# Patient Record
Sex: FEMALE | Race: WHITE | HISPANIC OR LATINO
[De-identification: names, ages, dates, MRNs, and addresses within clinical notes are randomized per-mention and may not be internally consistent; named-entity substitution may affect disease eponyms.]

---
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
## 2023-06-13 NOTE — H&P PST ADULT - ANTERIOR CERVICAL R
Anesthesia Post-op Note    Patient: Deloris Simental  Procedure(s) Performed: COLONOSCOPY  Anesthesia type: MAC    Vitals Value Taken Time   Temp 36.8 06/13/23 0902   Pulse 63 06/13/23 0902   Resp 21 06/13/23 0902   SpO2 97 06/13/23 0902   /53 06/13/23 0902         Patient Location: Phase II  Post-op Vital Signs:stable  Level of Consciousness: awake, sedated and participates in exam  Respiratory Status: spontaneous ventilation, unassisted and room air  Cardiovascular stable  Hydration: euvolemic  Pain Management: adequately controlled  Handoff: Handoff to receiving clinician was performed and questions were answered  Vomiting: none  Nausea: None  Airway Patency:patent  Post-op Assessment: awake, alert, appropriately conversant, or baseline, no complications, patient tolerated procedure well, no evidence of recall, dentition within defined limits and moving all extremities      There were no known notable events for this encounter.  
normal

## 2023-07-11 ENCOUNTER — RESULT CHARGE (OUTPATIENT)
Age: 18
End: 2023-07-11

## 2023-07-12 DIAGNOSIS — Q20.8 OTHER CONGENITAL MALFORMATIONS OF CARDIAC CHAMBERS AND CONNECTIONS: ICD-10-CM

## 2023-07-14 ENCOUNTER — APPOINTMENT (OUTPATIENT)
Dept: PEDIATRIC CARDIOLOGY | Facility: CLINIC | Age: 18
End: 2023-07-14
Payer: MEDICAID

## 2023-07-14 VITALS
DIASTOLIC BLOOD PRESSURE: 81 MMHG | BODY MASS INDEX: 31.49 KG/M2 | HEIGHT: 62.6 IN | HEART RATE: 81 BPM | WEIGHT: 175.49 LBS | SYSTOLIC BLOOD PRESSURE: 120 MMHG | OXYGEN SATURATION: 92 %

## 2023-07-14 PROCEDURE — 93303 ECHO TRANSTHORACIC: CPT

## 2023-07-14 PROCEDURE — 99214 OFFICE O/P EST MOD 30 MIN: CPT | Mod: 25

## 2023-07-14 PROCEDURE — 93320 DOPPLER ECHO COMPLETE: CPT

## 2023-07-14 PROCEDURE — 93000 ELECTROCARDIOGRAM COMPLETE: CPT

## 2023-07-14 PROCEDURE — 93325 DOPPLER ECHO COLOR FLOW MAPG: CPT

## 2023-07-14 RX ORDER — AMOXICILLIN 500 MG/1
500 CAPSULE ORAL
Qty: 4 | Refills: 4 | Status: ACTIVE | COMMUNITY
Start: 2023-07-14 | End: 1900-01-01

## 2023-07-14 NOTE — PHYSICAL EXAM
[General Appearance - Alert] : alert [Facies] : the head and face were normal in appearance [Sclera] : the sclera were normal [Auscultation Breath Sounds / Voice Sounds] : breath sounds clear to auscultation bilaterally [No Cough] : no cough [Sternotomy] : sternotomy [Well-Healed] : well-healed [Apical Impulse] : quiet precordium with normal apical impulse [Heart Rate And Rhythm] : normal heart rate and rhythm [Arterial Pulses] : normal upper and lower extremity pulses with no pulse delay [Edema] : no edema [Normal S1] : normal  [S2 Single] : was single [LUSB] : LUSB [Abdomen Soft] : soft [Nail Clubbing] : no clubbing  or cyanosis of the fingers [Demonstrated Behavior - Infant Nonreactive To Parents] : interactive [No Murmur] : no murmurs  [Palpable___cm below the RCM] : palpable [unfilled] cm below the right costal margin [Cervical Lymph Nodes Enlarged Anterior] : The anterior cervical nodes were normal [Cervical Lymph Nodes Enlarged Posterior] : The posterior cervical nodes were normal

## 2023-07-20 NOTE — HISTORY OF PRESENT ILLNESS
[FreeTextEntry1] : We had the pleasure of evaluating Sarahy Farley today in the Adult Congenital Heart Program at [facility]. 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 HEMODYNAMIC CATH: 2003 (PRE – [facility])\par 2. 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 Calixto\par 3. HEMODYNAMIC CATH: 12/3/2007 (PRE-FONTAN)\par 4. OR: 1/18/2008 – Non Fenestrated Extracardiac completion Fontan with 16 mm stretch Goretex tube\par 5. INTERVENTIONAL CATH: 7/29/2021 (right and left heart), coiling of tortuous veno-venous collateral from left innominate vein to the left atrium\par CO: 4.96L/min, Fontan pressure 10mmHg, right and left capillary wedge 5mmHg, LVEDp 5mmHg\par \par Sarahy is attending college and studying photography. She also works part time.  She is in need of dental care, not having been to a dentist in years as far as she can remember. \par \par At her last visit she had reported bloody stools and was having a colonoscopy. According to her, colonoscopy was negative with the exception of hemorrhoids that were the cause of the blood in her stools. \par \par In terms of her cardiovascular health, she has no limitations and goes to the gym regularly where she can perform 1 hour of aerobic activity.  She has no palpitations, chest discomfort or cough. There is no report of ascites, lower extremity edema. There is no bleeding or bruising  and she remains on her Aspirin.\par \par She is accompanied today by her partner.

## 2023-07-20 NOTE — REVIEW OF SYSTEMS
[Feeling Poorly] : not feeling poorly (malaise) [Fever] : no fever [Eye Discharge] : no eye discharge [Change in Vision] : no change in vision [Nasal Stuffiness] : no nasal congestion [Cyanosis] : no cyanosis [Chest Pain] : no chest pain or discomfort [Palpitations] : no palpitations [Orthopnea] : no orthopnea [Fast HR] : no tachycardia [Cough] : no cough [Vomiting] : no vomiting [Decrease In Appetite] : appetite not decreased [Fainting (Syncope)] : no fainting [Seizure] : no seizures [Headache] : no headache [Rash] : no rash [Easy Bruising] : no tendency for easy bruising [Easy Bleeding] : no ~M tendency for easy bleeding [Nosebleeds] : no epistaxis [Hyperactive] : no hyperactive behavior [Failure To Thrive] : no failure to thrive [Dec Urine Output] : no oliguria

## 2023-07-20 NOTE — CARDIOLOGY SUMMARY
[Today's Date] : [unfilled] [FreeTextEntry1] : NSR,ventricular rate 58 bpm,  left anterior fascicular block [FreeTextEntry2] : Summary:\par 1. Technically difficult study due to limited echo windows.\par 2. Double outlet right ventricle.\par 3. Status post placement of right bidirectional Monty shunt.\par 4. Status post extracardiac non-fenestrated Fontan conduit.\par 5. The right bidirectional Monty anastomosis is patent with low velocity laminar flow. The inferior limb of the Fontan completion is not seen to be able to comment on patency or thrombi. Consider alternative imaging.\par 6. Laminar flow is noted in the right pulmonary artery to the left of the Monty anastomosis and the\par immediate proximal portion of the left pulmonary artery. No obvious thrombi are seen in the visualized\par portions of the pulmonary arteries. The right pulmonary artery to the right of the Monty and majority of the left pulmonary artery are not seen.\par 7. Trivial aortic valve regurgitation.\par 8. Normal left ventricular systolic function.\par 9. Moderately hypoplastic right ventricle.\par 10. Poor and inadequate imaging of the right ventricular free wall to assess systolic function accurately.\par 11. Right aortic arch established on previous study.\par 12. The blind pulmonary artery stump is not seen.\par 13. No pericardial effusion.

## 2023-07-20 NOTE — DISCUSSION/SUMMARY
[Needs SBE Prophylaxis] : [unfilled]  needs bacterial endocarditis prophylaxis. SBE prophylaxis is indicated for dental and invasive ENT procedures. (Circulation. 2007; 116: 4804-3552) [FreeTextEntry1] : In summary, Sarahy is a 18 year old female s/p extracardiac Fontan for complex DORV anatomy. Her saturations are slightly lower today at 92% where they were 96% at her last visit so we will keep a close eye on this.\par \par We have previously discussed the increased incidence of cirrhosis and Fontan associated liver disease.  We would like her to see our Hepatologist who specializes in patients with Fontan physiology and undergo screening and ongoing surveillance with this specialized team.  She has no evidence of protein losing enteropathy at her visit today. \par \par We again reviewed the importance of meticulous dental hygiene and the need for regular dental cleanings. Sarahy will need lifetime SBE prophylaxis. She did not have the name of a local pharmacy but will get that information to us so that we may put an order in for antibiotics prior to any dental work.\par \par Lastly, she has no record of any advanced imaging in the form of a cardiac MRI so we have requested that she have an MRI so we may better image her Monty and Fontan conduits, her PA anatomy, coronaries and for any evidence of veno-venous collaterals.\par \par She has been educated to notify us for any swelling in her abdomen or legs, change in her stools or change in exercise capacity.\par \par We will see her back in eight months but remain available to see her sooner if clinically indicated.

## 2023-07-20 NOTE — CONSULT LETTER
[Today's Date] : [unfilled] [Name] : Name: [unfilled] [] : : ~~ [Today's Date:] : [unfilled] [Dear  ___:] : Dear Dr. [unfilled]: [Consult] : I had the pleasure of evaluating your patient, [unfilled]. My full evaluation follows. [Sincerely,] : Sincerely, [Consult - Multiple Provider] : Thank you very much for allowing us to participate in the care of this patient. If you have any questions, please do not hesitate to contact us. [FreeTextEntry4] : Quinton Law MD [FreeTextEntry5] : [address] [place] [address]\par  [de-identified] : Lea Hagen, MSN, CPNP-AC, PC\par Pediatric Cardiology, Adult Congenital Cardiology\par [facility]\par [facility] Pantoja [facility]\par \par Jarad Dominguez MD, TIFFANIE, MBI\par Medical Director, Adult Congenital Heart Program\par Professor of Pediatrics\par [employer] [facility] [employer] at [facility]\par

## 2023-10-02 ENCOUNTER — APPOINTMENT (OUTPATIENT)
Dept: HEPATOLOGY | Facility: CLINIC | Age: 18
End: 2023-10-02

## 2024-08-05 ENCOUNTER — APPOINTMENT (OUTPATIENT)
Dept: CARDIOLOGY | Facility: CLINIC | Age: 19
End: 2024-08-05

## 2024-08-05 ENCOUNTER — NON-APPOINTMENT (OUTPATIENT)
Age: 19
End: 2024-08-05

## 2024-08-05 PROCEDURE — 93000 ELECTROCARDIOGRAM COMPLETE: CPT

## 2024-08-05 PROCEDURE — 99204 OFFICE O/P NEW MOD 45 MIN: CPT | Mod: 25

## 2024-08-05 NOTE — REVIEW OF SYSTEMS
Return in about 2 weeks (around 9/25/2017), or if symptoms worsen or fail to improve. Dr LORETO Littlejohn or Dr JEANINE Aguiar    Please follow up for left knee symptoms that persist or worsen as discussed and call 911 or go to ER for any severe symptoms.    Continue current meds per Epic as advised.    Please see After Visit Summary for other details of the remaining discussion.    Use knee pads to reduce knee irritation.    Avoid needle aspiration of left knee bursal cyst.    Use yogurt or probiotics in diet three times a day for two weeks to prevent diarrhea or yeast infection from antibiotc use.    Tramadol use can cause confusion. Initiate medication at home to determine if this side effect occurs.  What is bursitis?  A bursa is a fluid-filled sac that helps cushion the muscles, tendons, and bones around a joint. When a bursa becomes inflamed, it’s called bursitis. Common symptoms of bursitis include pain, tenderness, and swelling that limits movement of the joint.  What causes bursitis?  Bursitis is most often caused by overuse of a joint. The repeated movements irritate the bursa and may cause it to swell. When that happens, other surrounding tissues may become inflamed or have less space to move. Bursitis is most common in large joints such as the knee, shoulder, and hip.       Nonsurgical treatment involves both rest and exercise.    How is bursitis treated?  To help reduce pain and swelling, your healthcare provider may recommend one or more of the following:   · Rest gives the bursa time to heal. This means limiting activities that put stress on the joint.  · Anti-inflammatory medications help reduce painful swelling. In some cases, this can include injections of cortisone or other steroid medicines into the bursa.  · Splints and support bandages improve your comfort and allow the bursa to heal.  · Physical therapy may be used to increase flexibility and strengthen muscles that support the  joint.  · Aspiration removes extra fluid from the bursa using a needle. This can help your healthcare provider find out what is causing your bursitis. For example, it might be an infection or overuse.   · Surgery can be used to remove an inflamed or infected bursa. This is rarely needed.  © 8594-9214 [employer]. [address] [place] [address], [place] [address]. All rights reserved. This information is not intended as a substitute for professional medical care. Always follow your healthcare professional's instructions.         [Negative] : Heme/Lymph

## 2024-08-09 ENCOUNTER — APPOINTMENT (OUTPATIENT)
Dept: CV DIAGNOSITCS | Facility: HOSPITAL | Age: 19
End: 2024-08-09

## 2024-08-09 ENCOUNTER — RESULT REVIEW (OUTPATIENT)
Age: 19
End: 2024-08-09

## 2024-08-09 ENCOUNTER — OUTPATIENT (OUTPATIENT)
Dept: OUTPATIENT SERVICES | Facility: HOSPITAL | Age: 19
LOS: 1 days | End: 2024-08-09
Payer: MEDICAID

## 2024-08-09 ENCOUNTER — TRANSCRIPTION ENCOUNTER (OUTPATIENT)
Age: 19
End: 2024-08-09

## 2024-08-09 DIAGNOSIS — Z98.890 OTHER SPECIFIED POSTPROCEDURAL STATES: Chronic | ICD-10-CM

## 2024-08-09 DIAGNOSIS — R00.2 PALPITATIONS: ICD-10-CM

## 2024-08-09 PROCEDURE — 93306 TTE W/DOPPLER COMPLETE: CPT | Mod: 26

## 2024-08-13 NOTE — ADDENDUM
[FreeTextEntry1] :  I have spoken with and examined the patient. I agree with the history, exam and assessment and plan.

## 2024-08-13 NOTE — DISCUSSION/SUMMARY
[EKG obtained to assist in diagnosis and management of assessed problem(s)] : EKG obtained to assist in diagnosis and management of assessed problem(s) [FreeTextEntry1] : In summary, Ms. Sarahy Farley is a y/o 18 y/o F seen today to [facility] care Cardiology program with complex congenital heart disease. She has been followed over the years at  the Adult Congenital Heart Program at [facility] and was referred for Adult Congenital Heart Care. 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 -She has undergone the following surgical interventions: notes from congenital heart program:  1. HEMODYNAMIC CATH: 2003 (PRE - SHAHRAM)  2. OR: 2004 - Bi-directional Shahram anastamosis and creation of pulmonary atresia, [facility], Dr. Calixto  3. HEMODYNAMIC CATH: 12/3/2007 (PRE-FONTAN)  4. OR: 2008 - Non Fenestrated Extracardiac completion Fontan with 16 mm stretch Goretex tube  5. INTERVENTIONAL CATH: 2021 (right and left heart), coiling of tortuous veno-venous collateral from left innominate vein to the left atrium  (CO: 4.96L/min, Fontan pressure 10mmHg, right and left capillary wedge 5mmHg, LVEDp 5mmHg) -Remainder delilah history remarkable for : Left ovarian cyst ( dysmenorrhea),  FH: unremarkable  Activity: walks, but no conditioning activities  Diet: admits to dietary indiscretion with high CHO and High Fat intake, attempting to increase fruits and vegetable.  Hydrates fairly well about 1 liter daily Meds: ASA 81 mg daily NKA  Diagnostics: TTE 2023 ( congenital): full report cardiology summary with no acute abnormalities.  Discussed with client referral to Adult Congenital Heart Specialist: Dr. Birdie Ledesma Follow up annual TTE ordered   #Adverse Cardiovascular Risk Factors: Congenital heart disease Personal history of prior open heart procedures as outlined  -TTE annually -Continues ASA 81 mg daily -Referral to Adult  Congenital Heart Disease Cardiologist: Dr. Birdie Ledesma - Encouraged patient to participate in  healthy walking and exercise (walking 20-30 minutes 5 times weekly) -Facilitate  healthy eating habits, focusing on a Mediterranean style of eating and aiming for the recommended 150 minutes per week of moderate physical activity. - Encouraged the patient to find healthy outlets and coping mechanisms to help manage stress, such as physical activity/exercise, reducing workload if possible, spending time with family and friends, engaging in an enjoyable hobby, or using meditation or mindfulness techniques.

## 2024-08-13 NOTE — CARDIOLOGY SUMMARY
[de-identified] : [facility] [place] [address] Phone: [phone] Fax: [phone]  Transthoracic Echocardiogram Report   Name:  MICHAEL ANNE    Sex: F         Date: 2023 10:06:16 AM IDX #: [id number]     : 2005  Hosp. MR #: ACC#:  [id number] Ht:  159.00 cm BP: 120/87 mm Hg Site:  [facility]          Wt:  79.60 kg  BSA: 1.91 m2 Age: 18 years  Referring Physician: Allison Lomeli M.D. 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 Limited acoustical window secondary to body habitus. The patient was awake. 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 Nikos Calixto); s/p non-fenestrated extracardiac Fontan completion (2008); s/p coiling of veno-venous collateral from innominate vein to LA (2021).  Summary: 1. Technically difficult study due to limited echo windows. 2. Double outlet right ventricle. 3. Status post placement of right bidirectional Monty shunt. 4. Status post extracardiac non-fenestrated Fontan conduit. 5. The right bidirectional Monty anastomosis is patent with low velocity laminar flow. The inferior limb of the Fontan completion is not seen to be able to comment on patency or thrombi. Consider alternative imaging. 6. Laminar flow is noted in the right pulmonary artery to the left of the Monty anastomosis and the immediate proximal portion of the left pulmonary artery. No obvious thrombi are seen in the visualized portions of the pulmonary arteries. The right pulmonary artery to the right of the Monty and majority of the left pulmonary artery are not seen. 7. Trivial aortic valve regurgitation. 8. Normal left ventricular systolic function. 9. Moderately hypoplastic right ventricle. 10. Poor and inadequate imaging of the right ventricular free wall to assess systolic function accurately. 11. Right aortic arch established on previous study. 12. The blind pulmonary artery stump is not seen. 13. No pericardial effusion.  Segmental Cardiotype, Cardiac Position, and Situs: S-Atrial situs solitus; D-Ventricular loop; D-Malposition of the great arteries. Systemic Veins: Status post placement of right bidirectional Monty shunt. Status post extracardiac non-fenestrated Fontan conduit. The right bidirectional Monty anastomosis is patent with low velocity laminar flow. The inferior limb of the Fontan completion is not seen to be able to comment on patency or thrombi. Consider alternative imaging. Pulmonary Veins: At least one pulmonary vein on each side return normally to the morphologic left atrium. Mitral Valve: Normal mitral valve morphology. Trivial mitral valve regurgitation is seen. Tricuspid Valve: The tricuspid valve is straddling the interventricular septum. There is mild tricuspid valve regurgitation. Left Ventricle:  Normal left ventricular systolic function. Right Ventricle: Moderately hypoplastic right ventricle. Poor and inadequate imaging of the right ventricular free wall to assess systolic function accurately. Interventricular Septum:  Non-restrictive, large, conoventricular ventricular septal defect. Conotruncal Anatomy: The observed structural malformations are consistent with the diagnosis of double outlet right ventricle. The aorta is extremely rightward. Left Ventricular Outflow Tract and Aortic Valve: Trivial aortic valve regurgitation. Normal systolic flow across aortic valve. There is no evidence of functional sub-aortic stenosis; i.e cono-ventricular septal defect is large and non-restrictive. Right Ventricular Outflow Tract and Pulmonary Valve: The blind pulmonary artery stump is not seen. Aorta: There is no coarctation of the aorta. Right aortic arch was established on previous study. Normal systolic and diastolic Doppler profile in the ascending and descending aorta and normal systolic Doppler profile in the descending aorta at the level of the diaphragm. Pulmonary Arteries:  Laminar flow is noted in the right pulmonary artery to the left of the Monty anastomosis and the immediate proximal portion of the left pulmonary artery. No obvious thrombi are seen in the visualized portions of the pulmonary arteries. The right pulmonary artery to the right of the Monty and majority of the left pulmonary artery are not seen. Pericardium: No pericardial effusion. Pleural Space: No pleural effusion. Interventional / Surgical Procedures: Status post placement of right bidirectional Monty shunt. Status post extracardiac non-fenestrated Fontan conduit.  2-Dimensional         Z-score (where applicable) RPA, s:       1.64 cm 0.28 LPA, s:       1.21 cm -1.23  Aortic Valve Doppler AR slope:            1.56 m/s2 AR P1/2 Time:         757 msec   All Z-scores are from [facility] unless otherwise specified by ([place]) after the value.  Electronically Signed By: [id number] Luana Huggins on 2023 at 3:09:22 PM CPT Codes: Congenital 2D echocardiogram, complete with color and Doppler - [id number] - Congenital 2D echocardiogram, complete with color and Doppler ICD-10 Codes: Double outlet right ventricle (DORV) - Q20.1  Pantoja Ped 1.1    *** Final ***  Ordered by: MAN CARTER       Collected/Examined: [id number] 10:06AM       Verified by: MAN CARTER [id number] 04:18PM       Result Communication: No patient communication needed at this time; Stage: Final       Performed at: [facility] [place] [address]       Resulted: [id number] 10:06AM       Last Updated: [id number] 04:18PM       Accession: [id number]       Results Hx:	 There are no additional results to show Details:	 To Be Done:	[id number] Status:	Complete For:	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 Communicated By:	Requested transmission to performing location Priority:	Routine Ordered By:	MAN CARTER Supervised By:	ALLISON LOMELI Managed By:	ALLISON LOMELI Authorization:	Not Required Performing Instructions:	1. OR: 2004 asaA" Bi-directional Monty anastamosis and creation of pulmonary atresia   2. OR: 2008 asaA" Non Fenestrated Extracardiac completion Fontan with 16 mm stretch Goretex tube Patient Instructions:	 Order Instructions:	 Questions:	 Reason A: function, AV valve, PA's Clinical History A: see box For Tech:Entered in [place]? A: N/A Comments: [facility] A: Per Protocol Performing Facility (FOR TECHNICIAN USE ONLY): A: [facility] [address] Add'l Details:	 Financial Auth:	Not Needed Authorization #:	 Appt. Status:	Appointment Not Needed Effective:	[id number] 12:00AM Expires:	2024 12:00AM Done:	[id number] 10:06AM Order #:	[id number] Requisition #:	[id number] Label Type:	 Collection:	Collection Specimen Identifier:	 ID:	 CPT:	 LOINC:	 SNOMED:	 Type:	 Charges:	None Will Be Collected in Office?	No Score:	0 NDS#:	 Content Source:	 Justification:	 View link item history	 Goals:	None Charging:	          (none) Encounters:

## 2024-08-13 NOTE — HISTORY OF PRESENT ILLNESS
[FreeTextEntry1] : Ms. Sarahy Farley is a y/o 20 y/o F seen today to [facility] Cardiology program with complex congenital heart disease.  She has been followed over the years at  the Adult Congenital Heart Program at [facility] and was referred for Adult Congenital Heart Care. 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 -She has undergone the following surgical interventions: notes from congenital heart program:  1. HEMODYNAMIC CATH: 2003 (PRE - SHAHRAM)  2. OR: 2004 - Bi-directional Shahram anastamosis and creation of pulmonary atresia, [facility], Dr. Calixto  3. HEMODYNAMIC CATH: 12/3/2007 (PRE-FONTAN)  4. OR: 2008 - Non Fenestrated Extracardiac completion Fontan with 16 mm stretch Goretex tube  5. INTERVENTIONAL CATH: 2021 (right and left heart), coiling of tortuous veno-venous collateral from left innominate vein to the left atrium  (CO: 4.96L/min, Fontan pressure 10mmHg, right and left capillary wedge 5mmHg, LVEDp 5mmHg) -Remainder delilah history remarkable for : Left ovarian cyst ( dysmenorrhea),  FH: unremarkable  Activity: walks, but no conditioning activities  Diet: admits to dietary indiscretion with high CHO and High Fat intake, attempting to increase fruits and vegetable.  Hydrates fairly well about 1 liter daily Meds: ASA 81 mg daily NKA no recent labwork Diagnostics: TTE 2023 ( congenital): full report cardiology summary with no acute abnormalities.  Discussed with client referral to Adult Congenital Heart Specialist: Dr. Birdie Ledesma Follow up annual TTE ordered  Sarahy is attending college and studying photography. She also works part time. She is in need of dental care, not having been to a dentist in years as far as she can remember. Deneis SOB ( not active), no rest chest pain, dyspnea, palpitations, dizziness, syncope, edema ROS other wise negative PSH: as above, colonoscopy 2 yrs ago after  reported bloody stools. According to her, colonoscopy was negative with the exception of hemorrhoids that were the cause of the blood in her stools. BP today: 118/85  HR 85 EKG: no ischemic changes

## 2024-08-13 NOTE — DISCUSSION/SUMMARY
[EKG obtained to assist in diagnosis and management of assessed problem(s)] : EKG obtained to assist in diagnosis and management of assessed problem(s) [FreeTextEntry1] : In summary, Ms. Sarahy Farley is a y/o 18 y/o F seen today to [facility] care Cardiology program with complex congenital heart disease. She has been followed over the years at  the Adult Congenital Heart Program at [facility] and was referred for Adult Congenital Heart Care. 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 -She has undergone the following surgical interventions: notes from congenital heart program:  1. HEMODYNAMIC CATH: 2003 (PRE - SHAHRAM)  2. OR: 2004 - Bi-directional Shahram anastamosis and creation of pulmonary atresia, [facility], Dr. Calixto  3. HEMODYNAMIC CATH: 12/3/2007 (PRE-FONTAN)  4. OR: 2008 - Non Fenestrated Extracardiac completion Fontan with 16 mm stretch Goretex tube  5. INTERVENTIONAL CATH: 2021 (right and left heart), coiling of tortuous veno-venous collateral from left innominate vein to the left atrium  (CO: 4.96L/min, Fontan pressure 10mmHg, right and left capillary wedge 5mmHg, LVEDp 5mmHg) -Remainder delilah history remarkable for : Left ovarian cyst ( dysmenorrhea),  FH: unremarkable  Activity: walks, but no conditioning activities  Diet: admits to dietary indiscretion with high CHO and High Fat intake, attempting to increase fruits and vegetable.  Hydrates fairly well about 1 liter daily Meds: ASA 81 mg daily NKA  Diagnostics: TTE 2023 ( congenital): full report cardiology summary with no acute abnormalities.  Discussed with client referral to Adult Congenital Heart Specialist: Dr. Birdie Ledesma Follow up annual TTE ordered   #Adverse Cardiovascular Risk Factors: Congenital heart disease Personal history of prior open heart procedures as outlined  -TTE annually -Continues ASA 81 mg daily -Referral to Adult  Congenital Heart Disease Cardiologist: Dr. Birdie Ledesma - Encouraged patient to participate in  healthy walking and exercise (walking 20-30 minutes 5 times weekly) -Facilitate  healthy eating habits, focusing on a Mediterranean style of eating and aiming for the recommended 150 minutes per week of moderate physical activity. - Encouraged the patient to find healthy outlets and coping mechanisms to help manage stress, such as physical activity/exercise, reducing workload if possible, spending time with family and friends, engaging in an enjoyable hobby, or using meditation or mindfulness techniques.

## 2024-08-13 NOTE — HISTORY OF PRESENT ILLNESS
[FreeTextEntry1] : Ms. Sarahy Farley is a y/o 20 y/o F seen today to [facility] Cardiology program with complex congenital heart disease.  She has been followed over the years at  the Adult Congenital Heart Program at [facility] and was referred for Adult Congenital Heart Care. 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 -She has undergone the following surgical interventions: notes from congenital heart program:  1. HEMODYNAMIC CATH: 2003 (PRE - SHAHRAM)  2. OR: 2004 - Bi-directional Shahram anastamosis and creation of pulmonary atresia, [facility], Dr. Calixto  3. HEMODYNAMIC CATH: 12/3/2007 (PRE-FONTAN)  4. OR: 2008 - Non Fenestrated Extracardiac completion Fontan with 16 mm stretch Goretex tube  5. INTERVENTIONAL CATH: 2021 (right and left heart), coiling of tortuous veno-venous collateral from left innominate vein to the left atrium  (CO: 4.96L/min, Fontan pressure 10mmHg, right and left capillary wedge 5mmHg, LVEDp 5mmHg) -Remainder delilah history remarkable for : Left ovarian cyst ( dysmenorrhea),  FH: unremarkable  Activity: walks, but no conditioning activities  Diet: admits to dietary indiscretion with high CHO and High Fat intake, attempting to increase fruits and vegetable.  Hydrates fairly well about 1 liter daily Meds: ASA 81 mg daily NKA no recent labwork Diagnostics: TTE 2023 ( congenital): full report cardiology summary with no acute abnormalities.  Discussed with client referral to Adult Congenital Heart Specialist: Dr. Birdie Ledesma Follow up annual TTE ordered  Sarahy is attending college and studying photography. She also works part time. She is in need of dental care, not having been to a dentist in years as far as she can remember. Deneis SOB ( not active), no rest chest pain, dyspnea, palpitations, dizziness, syncope, edema ROS other wise negative PSH: as above, colonoscopy 2 yrs ago after  reported bloody stools. According to her, colonoscopy was negative with the exception of hemorrhoids that were the cause of the blood in her stools. BP today: 118/85  HR 85 EKG: no ischemic changes

## 2024-08-13 NOTE — HISTORY OF PRESENT ILLNESS
[FreeTextEntry1] : Ms. Sarahy Farley is a y/o 20 y/o F seen today to [facility] Cardiology program with complex congenital heart disease.  She has been followed over the years at  the Adult Congenital Heart Program at [facility] and was referred for Adult Congenital Heart Care. 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 -She has undergone the following surgical interventions: notes from congenital heart program:  1. HEMODYNAMIC CATH: 2003 (PRE - SHAHRAM)  2. OR: 2004 - Bi-directional Shahram anastamosis and creation of pulmonary atresia, [facility], Dr. Calixto  3. HEMODYNAMIC CATH: 12/3/2007 (PRE-FONTAN)  4. OR: 2008 - Non Fenestrated Extracardiac completion Fontan with 16 mm stretch Goretex tube  5. INTERVENTIONAL CATH: 2021 (right and left heart), coiling of tortuous veno-venous collateral from left innominate vein to the left atrium  (CO: 4.96L/min, Fontan pressure 10mmHg, right and left capillary wedge 5mmHg, LVEDp 5mmHg) -Remainder delilah history remarkable for : Left ovarian cyst ( dysmenorrhea),  FH: unremarkable  Activity: walks, but no conditioning activities  Diet: admits to dietary indiscretion with high CHO and High Fat intake, attempting to increase fruits and vegetable.  Hydrates fairly well about 1 liter daily Meds: ASA 81 mg daily NKA no recent labwork Diagnostics: TTE 2023 ( congenital): full report cardiology summary with no acute abnormalities.  Discussed with client referral to Adult Congenital Heart Specialist: Dr. Birdie Ledesma Follow up annual TTE ordered  Sarahy is attending college and studying photography. She also works part time. She is in need of dental care, not having been to a dentist in years as far as she can remember. Deneis SOB ( not active), no rest chest pain, dyspnea, palpitations, dizziness, syncope, edema ROS other wise negative PSH: as above, colonoscopy 2 yrs ago after  reported bloody stools. According to her, colonoscopy was negative with the exception of hemorrhoids that were the cause of the blood in her stools. BP today: 118/85  HR 85 EKG: no ischemic changes

## 2024-08-13 NOTE — CARDIOLOGY SUMMARY
[de-identified] : [place] [place] [address] Phone: [phone] Fax: [phone]  Transthoracic Echocardiogram Report   Name:  MICHAEL ANNE    Sex: F         Date: 2023 10:06:16 AM IDX #: [id number]     : 2005  Hosp. MR #: ACC#:  [id number] Ht:  159.00 cm BP: 120/87 mm Hg Site:  [facility]          Wt:  79.60 kg  BSA: 1.91 m2 Age: 18 years  Referring Physician: Allison Lomeli M.D. 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 Limited acoustical window secondary to body habitus. The patient was awake. 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 Nikos Calixto); s/p non-fenestrated extracardiac Fontan completion (2008); s/p coiling of veno-venous collateral from innominate vein to LA (2021).  Summary: 1. Technically difficult study due to limited echo windows. 2. Double outlet right ventricle. 3. Status post placement of right bidirectional Monty shunt. 4. Status post extracardiac non-fenestrated Fontan conduit. 5. The right bidirectional Monty anastomosis is patent with low velocity laminar flow. The inferior limb of the Fontan completion is not seen to be able to comment on patency or thrombi. Consider alternative imaging. 6. Laminar flow is noted in the right pulmonary artery to the left of the Monty anastomosis and the immediate proximal portion of the left pulmonary artery. No obvious thrombi are seen in the visualized portions of the pulmonary arteries. The right pulmonary artery to the right of the Monty and majority of the left pulmonary artery are not seen. 7. Trivial aortic valve regurgitation. 8. Normal left ventricular systolic function. 9. Moderately hypoplastic right ventricle. 10. Poor and inadequate imaging of the right ventricular free wall to assess systolic function accurately. 11. Right aortic arch established on previous study. 12. The blind pulmonary artery stump is not seen. 13. No pericardial effusion.  Segmental Cardiotype, Cardiac Position, and Situs: S-Atrial situs solitus; D-Ventricular loop; D-Malposition of the great arteries. Systemic Veins: Status post placement of right bidirectional Monty shunt. Status post extracardiac non-fenestrated Fontan conduit. The right bidirectional Monty anastomosis is patent with low velocity laminar flow. The inferior limb of the Fontan completion is not seen to be able to comment on patency or thrombi. Consider alternative imaging. Pulmonary Veins: At least one pulmonary vein on each side return normally to the morphologic left atrium. Mitral Valve: Normal mitral valve morphology. Trivial mitral valve regurgitation is seen. Tricuspid Valve: The tricuspid valve is straddling the interventricular septum. There is mild tricuspid valve regurgitation. Left Ventricle:  Normal left ventricular systolic function. Right Ventricle: Moderately hypoplastic right ventricle. Poor and inadequate imaging of the right ventricular free wall to assess systolic function accurately. Interventricular Septum:  Non-restrictive, large, conoventricular ventricular septal defect. Conotruncal Anatomy: The observed structural malformations are consistent with the diagnosis of double outlet right ventricle. The aorta is extremely rightward. Left Ventricular Outflow Tract and Aortic Valve: Trivial aortic valve regurgitation. Normal systolic flow across aortic valve. There is no evidence of functional sub-aortic stenosis; i.e cono-ventricular septal defect is large and non-restrictive. Right Ventricular Outflow Tract and Pulmonary Valve: The blind pulmonary artery stump is not seen. Aorta: There is no coarctation of the aorta. Right aortic arch was established on previous study. Normal systolic and diastolic Doppler profile in the ascending and descending aorta and normal systolic Doppler profile in the descending aorta at the level of the diaphragm. Pulmonary Arteries:  Laminar flow is noted in the right pulmonary artery to the left of the Monty anastomosis and the immediate proximal portion of the left pulmonary artery. No obvious thrombi are seen in the visualized portions of the pulmonary arteries. The right pulmonary artery to the right of the Monty and majority of the left pulmonary artery are not seen. Pericardium: No pericardial effusion. Pleural Space: No pleural effusion. Interventional / Surgical Procedures: Status post placement of right bidirectional Monty shunt. Status post extracardiac non-fenestrated Fontan conduit.  2-Dimensional         Z-score (where applicable) RPA, s:       1.64 cm 0.28 LPA, s:       1.21 cm -1.23  Aortic Valve Doppler AR slope:            1.56 m/s2 AR P1/2 Time:         757 msec   All Z-scores are from [facility] unless otherwise specified by ([place]) after the value.  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  Pantoja Ped 1.1    *** Final ***  Ordered by: MAN CARTER       Collected/Examined: [id number] 10:06AM       Verified by: MAN CARTER [id number] 04:18PM       Result Communication: No patient communication needed at this time; Stage: Final       Performed at: [facility] [place] [facility] [address]       Resulted: [id number] 10:06AM       Last Updated: [id number] 04:18PM       Accession: [id number]       Results Hx:	 There are no additional results to show Details:	 To Be Done:	[id number] Status:	Complete For:	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 OR: 2004 asaA" Bi-directional Monty anastamosis and creation of pulmonary atresia   2. OR: 2008 asaA" Non Fenestrated Extracardiac completion Fontan with 16 mm stretch Goretex tube Patient Instructions:	 Order Instructions:	 Questions:	 Reason A: function, AV valve, PA's Clinical History A: see box For Tech:Entered in [place]? A: N/A Comments: [facility] A: Per Protocol Performing Facility (FOR TECHNICIAN USE ONLY): A: [address] [address] Add'l Details:	 Financial Auth:	Not Needed Authorization #:	 Appt. Status:	Appointment Not Needed Effective:	[id number] 12:00AM Expires:	2024 12:00AM Done:	65Hks9569 10:06AM Order #:	[id number] Requisition #:	[id number] Label Type:	 Collection:	Collection Specimen Identifier:	 ID:	 CPT:	 LOINC:	 SNOMED:	 Type:	 Charges:	None Will Be Collected in Office?	No Score:	0 NDS#:	 Content Source:	 Justification:	 View link item history	 Goals:	None Charging:	          (none) Encounters:

## 2024-08-13 NOTE — CARDIOLOGY SUMMARY
[de-identified] : [place] [place] [address] Phone: [phone] Fax: [phone]  Transthoracic Echocardiogram Report   Name:  MICHAEL ANNE    Sex: F         Date: 2023 10:06:16 AM IDX #: [id number]     : 2005  Hosp. MR #: ACC#:  [id number] Ht:  159.00 cm BP: 120/87 mm Hg Site:  [facility]          Wt:  79.60 kg  BSA: 1.91 m2 Age: 18 years  Referring Physician: Allison Lomeli M.D. 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 Limited acoustical window secondary to body habitus. The patient was awake. 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 Nikos Calixto); s/p non-fenestrated extracardiac Fontan completion (2008); s/p coiling of veno-venous collateral from innominate vein to LA (2021).  Summary: 1. Technically difficult study due to limited echo windows. 2. Double outlet right ventricle. 3. Status post placement of right bidirectional Monty shunt. 4. Status post extracardiac non-fenestrated Fontan conduit. 5. The right bidirectional Monty anastomosis is patent with low velocity laminar flow. The inferior limb of the Fontan completion is not seen to be able to comment on patency or thrombi. Consider alternative imaging. 6. Laminar flow is noted in the right pulmonary artery to the left of the Monty anastomosis and the immediate proximal portion of the left pulmonary artery. No obvious thrombi are seen in the visualized portions of the pulmonary arteries. The right pulmonary artery to the right of the Monty and majority of the left pulmonary artery are not seen. 7. Trivial aortic valve regurgitation. 8. Normal left ventricular systolic function. 9. Moderately hypoplastic right ventricle. 10. Poor and inadequate imaging of the right ventricular free wall to assess systolic function accurately. 11. Right aortic arch established on previous study. 12. The blind pulmonary artery stump is not seen. 13. No pericardial effusion.  Segmental Cardiotype, Cardiac Position, and Situs: S-Atrial situs solitus; D-Ventricular loop; D-Malposition of the great arteries. Systemic Veins: Status post placement of right bidirectional Monty shunt. Status post extracardiac non-fenestrated Fontan conduit. The right bidirectional Monty anastomosis is patent with low velocity laminar flow. The inferior limb of the Fontan completion is not seen to be able to comment on patency or thrombi. Consider alternative imaging. Pulmonary Veins: At least one pulmonary vein on each side return normally to the morphologic left atrium. Mitral Valve: Normal mitral valve morphology. Trivial mitral valve regurgitation is seen. Tricuspid Valve: The tricuspid valve is straddling the interventricular septum. There is mild tricuspid valve regurgitation. Left Ventricle:  Normal left ventricular systolic function. Right Ventricle: Moderately hypoplastic right ventricle. Poor and inadequate imaging of the right ventricular free wall to assess systolic function accurately. Interventricular Septum:  Non-restrictive, large, conoventricular ventricular septal defect. Conotruncal Anatomy: The observed structural malformations are consistent with the diagnosis of double outlet right ventricle. The aorta is extremely rightward. Left Ventricular Outflow Tract and Aortic Valve: Trivial aortic valve regurgitation. Normal systolic flow across aortic valve. There is no evidence of functional sub-aortic stenosis; i.e cono-ventricular septal defect is large and non-restrictive. Right Ventricular Outflow Tract and Pulmonary Valve: The blind pulmonary artery stump is not seen. Aorta: There is no coarctation of the aorta. Right aortic arch was established on previous study. Normal systolic and diastolic Doppler profile in the ascending and descending aorta and normal systolic Doppler profile in the descending aorta at the level of the diaphragm. Pulmonary Arteries:  Laminar flow is noted in the right pulmonary artery to the left of the Monty anastomosis and the immediate proximal portion of the left pulmonary artery. No obvious thrombi are seen in the visualized portions of the pulmonary arteries. The right pulmonary artery to the right of the Monty and majority of the left pulmonary artery are not seen. Pericardium: No pericardial effusion. Pleural Space: No pleural effusion. Interventional / Surgical Procedures: Status post placement of right bidirectional Monty shunt. Status post extracardiac non-fenestrated Fontan conduit.  2-Dimensional         Z-score (where applicable) RPA, s:       1.64 cm 0.28 LPA, s:       1.21 cm -1.23  Aortic Valve Doppler AR slope:            1.56 m/s2 AR P1/2 Time:         757 msec   All Z-scores are from [facility] unless otherwise specified by ([place]) after the value.  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  Pantoja Ped 1.1    *** Final ***  Ordered by: MAN CARTER       Collected/Examined: [id number] 10:06AM       Verified by: MAN CARTER [id number] 04:18PM       Result Communication: No patient communication needed at this time; Stage: Final       Performed at: [facility] [place] [address]       Resulted: [id number] 10:06AM       Last Updated: 76Brj7023 04:18PM       Accession: [id number]       Results Hx:	 There are no additional results to show Details:	 To Be Done:	[id number] Status:	Complete For:	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 OR: 2004 asaA" Bi-directional Monty anastamosis and creation of pulmonary atresia   2. OR: 2008 asaA" Non Fenestrated Extracardiac completion Fontan with 16 mm stretch Goretex tube Patient Instructions:	 Order Instructions:	 Questions:	 Reason A: function, AV valve, PA's Clinical History A: see box For Tech:Entered in [place]? A: N/A Comments: [facility] A: Per Protocol Performing Facility (FOR TECHNICIAN USE ONLY): A: [address] [address] Add'l Details:	 Financial Auth:	Not Needed Authorization #:	 Appt. Status:	Appointment Not Needed Effective:	[id number] 12:00AM Expires:	2024 12:00AM Done:	06Vnr3559 10:06AM Order #:	[id number] Requisition #:	[id number] Label Type:	 Collection:	Collection Specimen Identifier:	 ID:	 CPT:	 LOINC:	 SNOMED:	 Type:	 Charges:	None Will Be Collected in Office?	No Score:	0 NDS#:	 Content Source:	 Justification:	 View link item history	 Goals:	None Charging:	          (none) Encounters:

## 2024-08-13 NOTE — DISCUSSION/SUMMARY
[EKG obtained to assist in diagnosis and management of assessed problem(s)] : EKG obtained to assist in diagnosis and management of assessed problem(s) [FreeTextEntry1] : In summary, Ms. Sarahy Farley is a y/o 18 y/o F seen today to [facility] care Cardiology program with complex congenital heart disease. She has been followed over the years at  the Adult Congenital Heart Program at [facility] and was referred for Adult Congenital Heart Care. 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 -She has undergone the following surgical interventions: notes from congenital heart program:  1. HEMODYNAMIC CATH: 2003 (PRE - SHAHRAM)  2. OR: 2004 - Bi-directional Shahram anastamosis and creation of pulmonary atresia, [facility], Dr. Calixto  3. HEMODYNAMIC CATH: 12/3/2007 (PRE-FONTAN)  4. OR: 2008 - Non Fenestrated Extracardiac completion Fontan with 16 mm stretch Goretex tube  5. INTERVENTIONAL CATH: 2021 (right and left heart), coiling of tortuous veno-venous collateral from left innominate vein to the left atrium  (CO: 4.96L/min, Fontan pressure 10mmHg, right and left capillary wedge 5mmHg, LVEDp 5mmHg) -Remainder delilah history remarkable for : Left ovarian cyst ( dysmenorrhea),  FH: unremarkable  Activity: walks, but no conditioning activities  Diet: admits to dietary indiscretion with high CHO and High Fat intake, attempting to increase fruits and vegetable.  Hydrates fairly well about 1 liter daily Meds: ASA 81 mg daily NKA  Diagnostics: TTE 2023 ( congenital): full report cardiology summary with no acute abnormalities.  Discussed with client referral to Adult Congenital Heart Specialist: Dr. Birdie Ledesma Follow up annual TTE ordered   #Adverse Cardiovascular Risk Factors: Congenital heart disease Personal history of prior open heart procedures as outlined  -TTE annually -Continues ASA 81 mg daily -Referral to Adult  Congenital Heart Disease Cardiologist: Dr. Birdie Ledesma - Encouraged patient to participate in  healthy walking and exercise (walking 20-30 minutes 5 times weekly) -Facilitate  healthy eating habits, focusing on a Mediterranean style of eating and aiming for the recommended 150 minutes per week of moderate physical activity. - Encouraged the patient to find healthy outlets and coping mechanisms to help manage stress, such as physical activity/exercise, reducing workload if possible, spending time with family and friends, engaging in an enjoyable hobby, or using meditation or mindfulness techniques.

## 2024-11-04 ENCOUNTER — NON-APPOINTMENT (OUTPATIENT)
Age: 19
End: 2024-11-04

## 2024-11-04 ENCOUNTER — APPOINTMENT (OUTPATIENT)
Dept: CARDIOLOGY | Facility: CLINIC | Age: 19
End: 2024-11-04
Payer: MEDICAID

## 2024-11-04 VITALS
HEART RATE: 74 BPM | DIASTOLIC BLOOD PRESSURE: 79 MMHG | WEIGHT: 186 LBS | OXYGEN SATURATION: 92 % | BODY MASS INDEX: 33.37 KG/M2 | HEIGHT: 62.6 IN | SYSTOLIC BLOOD PRESSURE: 123 MMHG

## 2024-11-04 PROCEDURE — 99213 OFFICE O/P EST LOW 20 MIN: CPT | Mod: 25

## 2024-11-04 PROCEDURE — 93000 ELECTROCARDIOGRAM COMPLETE: CPT

## 2024-11-18 ENCOUNTER — APPOINTMENT (OUTPATIENT)
Dept: PEDIATRIC CARDIOLOGY | Facility: CLINIC | Age: 19
End: 2024-11-18
Payer: MEDICAID

## 2024-11-18 ENCOUNTER — RESULT CHARGE (OUTPATIENT)
Age: 19
End: 2024-11-18

## 2024-11-18 VITALS
HEART RATE: 63 BPM | SYSTOLIC BLOOD PRESSURE: 121 MMHG | WEIGHT: 186.29 LBS | HEIGHT: 62.17 IN | DIASTOLIC BLOOD PRESSURE: 80 MMHG | BODY MASS INDEX: 33.85 KG/M2 | OXYGEN SATURATION: 94 %

## 2024-11-18 PROCEDURE — G2211 COMPLEX E/M VISIT ADD ON: CPT | Mod: NC

## 2024-11-18 PROCEDURE — 99215 OFFICE O/P EST HI 40 MIN: CPT

## 2024-12-09 ENCOUNTER — APPOINTMENT (OUTPATIENT)
Dept: PEDIATRIC CARDIOLOGY | Facility: CLINIC | Age: 19
End: 2024-12-09

## 2025-01-08 ENCOUNTER — RESULT REVIEW (OUTPATIENT)
Age: 20
End: 2025-01-08

## 2025-01-08 ENCOUNTER — APPOINTMENT (OUTPATIENT)
Dept: MRI IMAGING | Facility: HOSPITAL | Age: 20
End: 2025-01-08

## 2025-01-08 ENCOUNTER — OUTPATIENT (OUTPATIENT)
Dept: OUTPATIENT SERVICES | Age: 20
LOS: 1 days | End: 2025-01-08

## 2025-01-08 DIAGNOSIS — Q20.1 DOUBLE OUTLET RIGHT VENTRICLE: ICD-10-CM

## 2025-01-08 DIAGNOSIS — Z98.890 OTHER SPECIFIED POSTPROCEDURAL STATES: Chronic | ICD-10-CM

## 2025-01-08 PROCEDURE — 71555 MRI ANGIO CHEST W OR W/O DYE: CPT | Mod: 26

## 2025-01-08 PROCEDURE — 75561 CARDIAC MRI FOR MORPH W/DYE: CPT | Mod: 26

## 2025-01-08 PROCEDURE — 75565 CARD MRI VELOC FLOW MAPPING: CPT | Mod: 26

## 2025-03-04 ENCOUNTER — EMERGENCY (EMERGENCY)
Facility: HOSPITAL | Age: 20
LOS: 1 days | Discharge: HOME | End: 2025-03-04
Attending: STUDENT IN AN ORGANIZED HEALTH CARE EDUCATION/TRAINING PROGRAM | Admitting: STUDENT IN AN ORGANIZED HEALTH CARE EDUCATION/TRAINING PROGRAM
Payer: MEDICAID

## 2025-03-04 VITALS
SYSTOLIC BLOOD PRESSURE: 104 MMHG | RESPIRATION RATE: 18 BRPM | OXYGEN SATURATION: 95 % | TEMPERATURE: 98 F | DIASTOLIC BLOOD PRESSURE: 70 MMHG | HEART RATE: 74 BPM

## 2025-03-04 VITALS
TEMPERATURE: 98 F | OXYGEN SATURATION: 97 % | DIASTOLIC BLOOD PRESSURE: 80 MMHG | SYSTOLIC BLOOD PRESSURE: 128 MMHG | RESPIRATION RATE: 18 BRPM | WEIGHT: 169.98 LBS | HEART RATE: 91 BPM

## 2025-03-04 DIAGNOSIS — Z98.890 OTHER SPECIFIED POSTPROCEDURAL STATES: Chronic | ICD-10-CM

## 2025-03-04 LAB
ANION GAP SERPL CALC-SCNC: 13 MMOL/L — SIGNIFICANT CHANGE UP (ref 9–16)
BASOPHILS # BLD AUTO: 0.04 K/UL — SIGNIFICANT CHANGE UP (ref 0–0.2)
BASOPHILS NFR BLD AUTO: 0.3 % — SIGNIFICANT CHANGE UP (ref 0–2)
BUN SERPL-MCNC: 18 MG/DL — SIGNIFICANT CHANGE UP (ref 7–23)
CALCIUM SERPL-MCNC: 9.5 MG/DL — SIGNIFICANT CHANGE UP (ref 8.5–10.5)
CHLORIDE SERPL-SCNC: 105 MMOL/L — SIGNIFICANT CHANGE UP (ref 96–108)
CO2 SERPL-SCNC: 20 MMOL/L — LOW (ref 22–31)
CREAT SERPL-MCNC: 0.92 MG/DL — SIGNIFICANT CHANGE UP (ref 0.5–1.3)
EGFR: 90 ML/MIN/1.73M2 — SIGNIFICANT CHANGE UP
EOSINOPHIL # BLD AUTO: 0.07 K/UL — SIGNIFICANT CHANGE UP (ref 0–0.5)
EOSINOPHIL NFR BLD AUTO: 0.5 % — SIGNIFICANT CHANGE UP (ref 0–6)
GLUCOSE SERPL-MCNC: 90 MG/DL — SIGNIFICANT CHANGE UP (ref 70–99)
HCT VFR BLD CALC: 42.3 % — SIGNIFICANT CHANGE UP (ref 34.5–45)
HGB BLD-MCNC: 13.9 G/DL — SIGNIFICANT CHANGE UP (ref 11.5–15.5)
IMM GRANULOCYTES # BLD AUTO: 0.07 K/UL — SIGNIFICANT CHANGE UP (ref 0–0.07)
IMM GRANULOCYTES NFR BLD AUTO: 0.5 % — SIGNIFICANT CHANGE UP (ref 0–0.9)
LYMPHOCYTES # BLD AUTO: 1.57 K/UL — SIGNIFICANT CHANGE UP (ref 1–3.3)
LYMPHOCYTES NFR BLD AUTO: 11.1 % — LOW (ref 13–44)
MCHC RBC-ENTMCNC: 28.4 PG — SIGNIFICANT CHANGE UP (ref 27–34)
MCHC RBC-ENTMCNC: 32.9 G/DL — SIGNIFICANT CHANGE UP (ref 32–36)
MCV RBC AUTO: 86.5 FL — SIGNIFICANT CHANGE UP (ref 80–100)
MONOCYTES # BLD AUTO: 0.64 K/UL — SIGNIFICANT CHANGE UP (ref 0–0.9)
MONOCYTES NFR BLD AUTO: 4.5 % — SIGNIFICANT CHANGE UP (ref 2–14)
NEUTROPHILS # BLD AUTO: 11.74 K/UL — HIGH (ref 1.8–7.4)
NEUTROPHILS NFR BLD AUTO: 83.1 % — HIGH (ref 43–77)
NRBC # BLD AUTO: 0 K/UL — SIGNIFICANT CHANGE UP (ref 0–0)
NRBC # FLD: 0 K/UL — SIGNIFICANT CHANGE UP (ref 0–0)
NRBC BLD AUTO-RTO: 0 /100 WBCS — SIGNIFICANT CHANGE UP (ref 0–0)
PLATELET # BLD AUTO: 205 K/UL — SIGNIFICANT CHANGE UP (ref 150–400)
PMV BLD: 12 FL — SIGNIFICANT CHANGE UP (ref 7–13)
POTASSIUM SERPL-MCNC: 5.1 MMOL/L — SIGNIFICANT CHANGE UP (ref 3.5–5.3)
POTASSIUM SERPL-SCNC: 5.1 MMOL/L — SIGNIFICANT CHANGE UP (ref 3.5–5.3)
RBC # BLD: 4.89 M/UL — SIGNIFICANT CHANGE UP (ref 3.8–5.2)
RBC # FLD: 13.3 % — SIGNIFICANT CHANGE UP (ref 10.3–14.5)
SODIUM SERPL-SCNC: 138 MMOL/L — SIGNIFICANT CHANGE UP (ref 132–145)
TROPONIN I, HIGH SENSITIVITY RESULT: 4.8 NG/L — SIGNIFICANT CHANGE UP
WBC # BLD: 14.13 K/UL — HIGH (ref 3.8–10.5)
WBC # FLD AUTO: 14.13 K/UL — HIGH (ref 3.8–10.5)

## 2025-03-04 PROCEDURE — 99284 EMERGENCY DEPT VISIT MOD MDM: CPT

## 2025-03-04 PROCEDURE — 71046 X-RAY EXAM CHEST 2 VIEWS: CPT | Mod: 26

## 2025-03-04 NOTE — ED PROVIDER NOTE - PATIENT PORTAL LINK FT
You can access the FollowMyHealth Patient Portal offered by [facility] by registering at the following website: [email or web address]. By joining [employer]’s FollowMyHealth portal, you will also be able to view your health information using other applications (apps) compatible with our system.
0 = independent

## 2025-03-04 NOTE — ED PROVIDER NOTE - CARE PLAN
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

## 2025-03-04 NOTE — ED PROVIDER NOTE - CLINICAL SUMMARY MEDICAL DECISION MAKING FREE TEXT BOX
stable well appearing  hx congenital heart disease w assoc chf  chest pain in setting of severe pain and associated pre-syncope     ddx inc angina vs dysrhythmia vs PE vs pulmonary problem vs stress associated angina vs coronary vasospasm    well appearing  trop wnl hours after single chest pain event wo recurrence or persistent chest pain  ekg is stable on repeat w stable TWI     encourage continue regular treatment   HEART = low  doubt PE

## 2025-03-04 NOTE — ED ADULT TRIAGE NOTE - CHIEF COMPLAINT QUOTE
pt BIBA c/o chest tightness and abdominal cramping, pt currently on menstrual cycle. pt has hx of CHF, diagnosed when pt was 1 year old. pt takes aspirin daily.

## 2025-03-04 NOTE — ED PROVIDER NOTE - PHYSICAL EXAMINATION
gen: well appearing  s1s2 no murmur  lungs clear tab  abdomen soft nontender  legs w no edema, no tenderness  skin no rash

## 2025-03-04 NOTE — ED PROVIDER NOTE - OBJECTIVE STATEMENT
19yo woman with history of congenital heart problem sp surgical correction still follows w cardiology, also hx severely painful menstruation, and migraines + menstrual migraines  here during menstrual cycle with ongoing severe abdominal pain which feels like the usual. earlier today while seated had an episode of severe pain then felt that the room was very hot and stuffy. And with that, chest pain and shortness of breath to the point of feeling she would pass out. CP and SOB passed shortly,    at this time chest pain free  no ongoing sob  not feeling like passing out    has fu w cardiologist 3/17 for reeval  never known CAD, never MI, never thromboembolic events, no HTN, no smoking   is otherwise in usual state of health   takes ASA daily no other regular meds except tylenol for menstrual pain

## 2025-03-04 NOTE — ED PROVIDER NOTE - NSFOLLOWUPINSTRUCTIONS_ED_ALL_ED_FT
continue your usual medications  Ask your doctor if you could take a higher dose of aspirin for menstrual associated abdominal pain    For new or progressive chest pain, passing out, shortness of breath or other concerns, see your doctor or call 911

## 2025-03-06 DIAGNOSIS — I50.9 HEART FAILURE, UNSPECIFIED: ICD-10-CM

## 2025-03-06 DIAGNOSIS — Z79.82 LONG TERM (CURRENT) USE OF ASPIRIN: ICD-10-CM

## 2025-03-06 DIAGNOSIS — Z87.74 PERSONAL HISTORY OF (CORRECTED) CONGENITAL MALFORMATIONS OF HEART AND CIRCULATORY SYSTEM: ICD-10-CM

## 2025-03-06 DIAGNOSIS — R07.89 OTHER CHEST PAIN: ICD-10-CM

## 2025-03-17 ENCOUNTER — APPOINTMENT (OUTPATIENT)
Dept: HEPATOLOGY | Facility: CLINIC | Age: 20
End: 2025-03-17
Payer: MEDICAID

## 2025-03-17 VITALS
DIASTOLIC BLOOD PRESSURE: 70 MMHG | HEIGHT: 62.17 IN | BODY MASS INDEX: 34.89 KG/M2 | OXYGEN SATURATION: 93 % | WEIGHT: 192 LBS | HEART RATE: 85 BPM | RESPIRATION RATE: 16 BRPM | SYSTOLIC BLOOD PRESSURE: 117 MMHG | TEMPERATURE: 97.3 F

## 2025-03-17 DIAGNOSIS — Z87.74 OTHER POSTPROCEDURAL COMPLICATIONS AND DISORDERS OF DIGESTIVE SYSTEM: ICD-10-CM

## 2025-03-17 DIAGNOSIS — K76.9 OTHER POSTPROCEDURAL COMPLICATIONS AND DISORDERS OF DIGESTIVE SYSTEM: ICD-10-CM

## 2025-03-17 DIAGNOSIS — K91.89 OTHER POSTPROCEDURAL COMPLICATIONS AND DISORDERS OF DIGESTIVE SYSTEM: ICD-10-CM

## 2025-03-17 PROBLEM — Q24.9 CONGENITAL MALFORMATION OF HEART, UNSPECIFIED: Chronic | Status: ACTIVE | Noted: 2025-03-04

## 2025-03-17 PROCEDURE — 99204 OFFICE O/P NEW MOD 45 MIN: CPT

## 2025-03-17 PROCEDURE — 76981 USE PARENCHYMA: CPT

## 2025-03-18 PROBLEM — K91.89 LIVER DISEASE AFTER FONTAN PROCEDURE: Status: ACTIVE | Noted: 2025-03-17

## 2025-03-18 LAB
25(OH)D3 SERPL-MCNC: 17.3 NG/ML
AFP-TM SERPL-MCNC: 1.8 NG/ML
ALBUMIN SERPL ELPH-MCNC: 4.5 G/DL
ALP BLD-CCNC: 76 U/L
ALT SERPL-CCNC: 20 U/L
ANION GAP SERPL CALC-SCNC: 12 MMOL/L
AST SERPL-CCNC: 20 U/L
BASOPHILS # BLD AUTO: 0.04 K/UL
BASOPHILS NFR BLD AUTO: 0.4 %
BILIRUB DIRECT SERPL-MCNC: 0.1 MG/DL
BILIRUB SERPL-MCNC: 0.3 MG/DL
BUN SERPL-MCNC: 17 MG/DL
CALCIUM SERPL-MCNC: 9.8 MG/DL
CHLORIDE SERPL-SCNC: 104 MMOL/L
CHOLEST SERPL-MCNC: 135 MG/DL
CO2 SERPL-SCNC: 21 MMOL/L
CREAT SERPL-MCNC: 0.9 MG/DL
EGFRCR SERPLBLD CKD-EPI 2021: 93 ML/MIN/1.73M2
EOSINOPHIL # BLD AUTO: 0.13 K/UL
EOSINOPHIL NFR BLD AUTO: 1.2 %
ESTIMATED AVERAGE GLUCOSE: 117 MG/DL
GLUCOSE SERPL-MCNC: 91 MG/DL
HBA1C MFR BLD HPLC: 5.7 %
HBV CORE IGG+IGM SER QL: NONREACTIVE
HBV SURFACE AB SERPL IA-ACNC: <3.3 MIU/ML
HBV SURFACE AG SER QL: NONREACTIVE
HCT VFR BLD CALC: 40.9 %
HCV AB SER QL: NONREACTIVE
HCV S/CO RATIO: 0.14 S/CO
HDLC SERPL-MCNC: 47 MG/DL
HEPATITIS A IGG ANTIBODY: REACTIVE
HGB BLD-MCNC: 12.9 G/DL
IMM GRANULOCYTES NFR BLD AUTO: 0.4 %
INR PPP: 1 RATIO
LDLC SERPL-MCNC: 73 MG/DL
LYMPHOCYTES # BLD AUTO: 2.37 K/UL
LYMPHOCYTES NFR BLD AUTO: 22.4 %
MAN DIFF?: NORMAL
MCHC RBC-ENTMCNC: 28.3 PG
MCHC RBC-ENTMCNC: 31.5 G/DL
MCV RBC AUTO: 89.7 FL
MONOCYTES # BLD AUTO: 0.72 K/UL
MONOCYTES NFR BLD AUTO: 6.8 %
NEUTROPHILS # BLD AUTO: 7.3 K/UL
NEUTROPHILS NFR BLD AUTO: 68.8 %
NONHDLC SERPL-MCNC: 88 MG/DL
PLATELET # BLD AUTO: 271 K/UL
POTASSIUM SERPL-SCNC: 4.5 MMOL/L
PROT SERPL-MCNC: 7.2 G/DL
PT BLD: 11.8 SEC
RBC # BLD: 4.56 M/UL
RBC # FLD: 13.9 %
SODIUM SERPL-SCNC: 137 MMOL/L
TRIGL SERPL-MCNC: 78 MG/DL
TSH SERPL-ACNC: 3.44 UIU/ML
WBC # FLD AUTO: 10.6 K/UL

## 2025-05-01 ENCOUNTER — APPOINTMENT (OUTPATIENT)
Dept: CARDIOLOGY | Facility: CLINIC | Age: 20
End: 2025-05-01

## 2025-05-05 ENCOUNTER — NON-APPOINTMENT (OUTPATIENT)
Age: 20
End: 2025-05-05

## 2025-05-05 ENCOUNTER — RESULT CHARGE (OUTPATIENT)
Age: 20
End: 2025-05-05

## 2025-05-05 ENCOUNTER — APPOINTMENT (OUTPATIENT)
Dept: PEDIATRIC CARDIOLOGY | Facility: CLINIC | Age: 20
End: 2025-05-05
Payer: MEDICAID

## 2025-05-05 VITALS
OXYGEN SATURATION: 95 % | WEIGHT: 192.68 LBS | BODY MASS INDEX: 35.01 KG/M2 | HEART RATE: 73 BPM | HEIGHT: 62.4 IN | DIASTOLIC BLOOD PRESSURE: 79 MMHG | SYSTOLIC BLOOD PRESSURE: 122 MMHG

## 2025-05-05 DIAGNOSIS — Z87.74 PERSONAL HISTORY OF (CORRECTED) CONGENITAL MALFORMATIONS OF HEART AND CIRCULATORY SYSTEM: ICD-10-CM

## 2025-05-05 DIAGNOSIS — Q24.9 CONGENITAL MALFORMATION OF HEART, UNSPECIFIED: ICD-10-CM

## 2025-05-05 DIAGNOSIS — Q20.1 DOUBLE OUTLET RIGHT VENTRICLE: ICD-10-CM

## 2025-05-05 PROCEDURE — 99215 OFFICE O/P EST HI 40 MIN: CPT

## 2025-07-16 ENCOUNTER — OUTPATIENT (OUTPATIENT)
Dept: OUTPATIENT SERVICES | Age: 20
LOS: 1 days | End: 2025-07-16

## 2025-07-16 VITALS
RESPIRATION RATE: 18 BRPM | WEIGHT: 191.58 LBS | TEMPERATURE: 98 F | DIASTOLIC BLOOD PRESSURE: 88 MMHG | HEART RATE: 93 BPM | SYSTOLIC BLOOD PRESSURE: 124 MMHG | OXYGEN SATURATION: 100 % | HEIGHT: 61.81 IN

## 2025-07-16 DIAGNOSIS — Z98.890 OTHER SPECIFIED POSTPROCEDURAL STATES: Chronic | ICD-10-CM

## 2025-07-16 DIAGNOSIS — Q20.1 DOUBLE OUTLET RIGHT VENTRICLE: ICD-10-CM

## 2025-07-16 LAB
ADD ON TEST-SPECIMEN IN LAB: SIGNIFICANT CHANGE UP
ALBUMIN SERPL ELPH-MCNC: 4.6 G/DL — SIGNIFICANT CHANGE UP (ref 3.3–5)
ALP SERPL-CCNC: 75 U/L — SIGNIFICANT CHANGE UP (ref 40–120)
ALT FLD-CCNC: 20 U/L — SIGNIFICANT CHANGE UP (ref 4–33)
ANION GAP SERPL CALC-SCNC: 14 MMOL/L — SIGNIFICANT CHANGE UP (ref 7–14)
AST SERPL-CCNC: 23 U/L — SIGNIFICANT CHANGE UP (ref 4–32)
BASOPHILS # BLD AUTO: 0.05 K/UL — SIGNIFICANT CHANGE UP (ref 0–0.2)
BASOPHILS NFR BLD AUTO: 0.4 % — SIGNIFICANT CHANGE UP (ref 0–2)
BILIRUB SERPL-MCNC: 0.4 MG/DL — SIGNIFICANT CHANGE UP (ref 0.2–1.2)
BLD GP AB SCN SERPL QL: NEGATIVE — SIGNIFICANT CHANGE UP
BUN SERPL-MCNC: 16 MG/DL — SIGNIFICANT CHANGE UP (ref 7–23)
CALCIUM SERPL-MCNC: 9.9 MG/DL — SIGNIFICANT CHANGE UP (ref 8.4–10.5)
CHLORIDE SERPL-SCNC: 105 MMOL/L — SIGNIFICANT CHANGE UP (ref 98–107)
CO2 SERPL-SCNC: 20 MMOL/L — LOW (ref 22–31)
CREAT SERPL-MCNC: 0.96 MG/DL — SIGNIFICANT CHANGE UP (ref 0.5–1.3)
EGFR: 86 ML/MIN/1.73M2 — SIGNIFICANT CHANGE UP
EGFR: 86 ML/MIN/1.73M2 — SIGNIFICANT CHANGE UP
EOSINOPHIL # BLD AUTO: 0.11 K/UL — SIGNIFICANT CHANGE UP (ref 0–0.5)
EOSINOPHIL NFR BLD AUTO: 0.9 % — SIGNIFICANT CHANGE UP (ref 0–6)
GLUCOSE SERPL-MCNC: 88 MG/DL — SIGNIFICANT CHANGE UP (ref 70–99)
HCT VFR BLD CALC: 40.9 % — SIGNIFICANT CHANGE UP (ref 34.5–45)
HCT VFR BLD CALC: 40.9 % — SIGNIFICANT CHANGE UP (ref 34.5–45)
HGB BLD-MCNC: 13.5 G/DL — SIGNIFICANT CHANGE UP (ref 11.5–15.5)
HGB BLD-MCNC: 13.5 G/DL — SIGNIFICANT CHANGE UP (ref 11.5–15.5)
IMM GRANULOCYTES # BLD AUTO: 0.03 K/UL — SIGNIFICANT CHANGE UP (ref 0–0.07)
IMM GRANULOCYTES NFR BLD AUTO: 0.3 % — SIGNIFICANT CHANGE UP (ref 0–0.9)
LYMPHOCYTES # BLD AUTO: 1.92 K/UL — SIGNIFICANT CHANGE UP (ref 1–3.3)
LYMPHOCYTES NFR BLD AUTO: 16.4 % — SIGNIFICANT CHANGE UP (ref 13–44)
MCHC RBC-ENTMCNC: 28.2 PG — SIGNIFICANT CHANGE UP (ref 27–34)
MCHC RBC-ENTMCNC: 28.2 PG — SIGNIFICANT CHANGE UP (ref 27–34)
MCHC RBC-ENTMCNC: 33 G/DL — SIGNIFICANT CHANGE UP (ref 32–36)
MCHC RBC-ENTMCNC: 33 G/DL — SIGNIFICANT CHANGE UP (ref 32–36)
MCV RBC AUTO: 85.6 FL — SIGNIFICANT CHANGE UP (ref 80–100)
MCV RBC AUTO: 85.6 FL — SIGNIFICANT CHANGE UP (ref 80–100)
MONOCYTES # BLD AUTO: 0.84 K/UL — SIGNIFICANT CHANGE UP (ref 0–0.9)
MONOCYTES NFR BLD AUTO: 7.2 % — SIGNIFICANT CHANGE UP (ref 2–14)
NEUTROPHILS # BLD AUTO: 8.73 K/UL — HIGH (ref 1.8–7.4)
NEUTROPHILS NFR BLD AUTO: 74.8 % — SIGNIFICANT CHANGE UP (ref 43–77)
NRBC # BLD AUTO: 0 K/UL — SIGNIFICANT CHANGE UP (ref 0–0)
NRBC # BLD AUTO: 0 K/UL — SIGNIFICANT CHANGE UP (ref 0–0)
NRBC # FLD: 0 K/UL — SIGNIFICANT CHANGE UP (ref 0–0)
NRBC # FLD: 0 K/UL — SIGNIFICANT CHANGE UP (ref 0–0)
NRBC BLD AUTO-RTO: 0 /100 WBCS — SIGNIFICANT CHANGE UP (ref 0–0)
NRBC BLD AUTO-RTO: 0 /100 WBCS — SIGNIFICANT CHANGE UP (ref 0–0)
PLATELET # BLD AUTO: 263 K/UL — SIGNIFICANT CHANGE UP (ref 150–400)
PLATELET # BLD AUTO: 263 K/UL — SIGNIFICANT CHANGE UP (ref 150–400)
PMV BLD: 11.5 FL — SIGNIFICANT CHANGE UP (ref 7–13)
PMV BLD: 11.5 FL — SIGNIFICANT CHANGE UP (ref 7–13)
POTASSIUM SERPL-MCNC: 4.5 MMOL/L — SIGNIFICANT CHANGE UP (ref 3.5–5.3)
POTASSIUM SERPL-SCNC: 4.5 MMOL/L — SIGNIFICANT CHANGE UP (ref 3.5–5.3)
PROT SERPL-MCNC: 7.4 G/DL — SIGNIFICANT CHANGE UP (ref 6–8.3)
RBC # BLD: 4.78 M/UL — SIGNIFICANT CHANGE UP (ref 3.8–5.2)
RBC # BLD: 4.78 M/UL — SIGNIFICANT CHANGE UP (ref 3.8–5.2)
RBC # FLD: 13.1 % — SIGNIFICANT CHANGE UP (ref 10.3–14.5)
RBC # FLD: 13.1 % — SIGNIFICANT CHANGE UP (ref 10.3–14.5)
RH IG SCN BLD-IMP: POSITIVE — SIGNIFICANT CHANGE UP
SODIUM SERPL-SCNC: 139 MMOL/L — SIGNIFICANT CHANGE UP (ref 135–145)
WBC # BLD: 11.68 K/UL — HIGH (ref 3.8–10.5)
WBC # BLD: 11.68 K/UL — HIGH (ref 3.8–10.5)
WBC # FLD AUTO: 11.68 K/UL — HIGH (ref 3.8–10.5)
WBC # FLD AUTO: 11.68 K/UL — HIGH (ref 3.8–10.5)

## 2025-07-16 NOTE — H&P PST PEDIATRIC - RESPIRATORY
Breath sounds clear to auscultation bilaterally details No chest wall deformities/Normal respiratory pattern

## 2025-07-16 NOTE — H&P PST PEDIATRIC - OTHER
Holter Monitor: 11/18/2024. Rare PACs, No PVCs.       Cardiac MRI: 01/08/2025. IMPRESSION:  Unobstructed Fontan pathway. Discrete caliber change between the suprahepatic inferior vena cava and mid-Fontan pathway, as described below.  Possible circumferential endoluminal thickening of the mid Fontan conduit.  Unobstructed superior vena cava and superior cavopulmonary anastomosis.  Prominent veno-venous collateral with  5% collateral flow.  Mildly hypoplastic left ventricle with normal global systolic function.  Mildly hypoplastic right ventricle with borderline depressed global systolic function.      ? Holter Monitor: 11/18/2024. Rare PACs, No PVCs.       Cardiac MRI: 01/08/2025. IMPRESSION:  Unobstructed Fontan pathway. Discrete caliber change between the suprahepatic inferior vena cava and mid-Fontan pathway, as described below.  Possible circumferential endoluminal thickening of the mid Fontan conduit.  Unobstructed superior vena cava and superior cavopulmonary anastomosis.  Prominent veno-venous collateral with 5% collateral flow.  Mildly hypoplastic left ventricle with normal global systolic function.  Mildly hypoplastic right ventricle with borderline depressed global systolic function.

## 2025-07-16 NOTE — H&P PST PEDIATRIC - NS CHILD LIFE INTERVENTIONS
established a supportive relationship with patient/family/emotional support was provided/psychological preparation for sedated procedure/scan was provided

## 2025-07-16 NOTE — H&P PST PEDIATRIC - SYMPTOMS
Denies s/s of illness in the last two weeks. Denies use of albuterol or oral steroids in the last 6 months. Follows with Dr. Ledesma for complex cardiac history (see HPI). Now scheduled for cardiac catheterization. 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 Last seen on 3/17/2025. Recommended follow up in 6 months.

## 2025-07-16 NOTE — H&P PST PEDIATRIC - NSICDXPASTSURGICALHX_GEN_ALL_CORE_FT
PAST SURGICAL HISTORY:  History of heart surgery Cardiac catheterization on 7/27/03  S/p bidirectional Monty anastomosis performed with creation of pulmonary atresia on 4/16/04.  S/p cardiac catheterization on 12/3/07 (pre-fontan).  S/p fontan procedure consisting of placement of a 16 mm extracardiac non-fenestrated conduit on 1/18/08

## 2025-07-16 NOTE — H&P PST PEDIATRIC - CARDIOVASCULAR
+murmur  well healed surgical scar Regular rate and variability/Normal S1, S2/Symmetric upper and lower extremity pulses of normal amplitude details Regular rate and variability/Symmetric upper and lower extremity pulses of normal amplitude

## 2025-07-16 NOTE — H&P PST PEDIATRIC - REASON FOR ADMISSION
Presents to PST today for evaluation prior to cardiac [facility] on 7/24/2025 with Dr. Power.

## 2025-07-16 NOTE — H&P PST PEDIATRIC - COMMENTS
Up to date on vaccines.   No vaccines given in the last two weeks. 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  She is s/p bidirectional guy anastomosis performed with creation of pulmonary atresia and a fontan procedure consisting of a 16 mm extracardiac, non fenestrating conduit.  Her recent stress test shows oxygen desaturation with exertion, decreasing to 88%. Most recently she is s/p cardiac catheterization to assess her fontan function, pressures, physiology as well as to assess to veno-venous collaterals in 2021. 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 Denies complications with anesthesia or prolonged bleeding. Presents to PST today for optimization prior to surgery.    FHx:  Mother: No PMH, No PSH  Father:  Hx of motorcycle accident requiring orthopedic surgeries   Siblings: 12 y/o 1/2 maternal brother: No PMH  15 y/o 1/2 maternal brother: No PMH  21 y/o 1/2 maternal sister: No PMH  MGM:  MGF:  PGM:  PGF:   Reports no family history of anesthesia complications or prolonged bleeding. FHx:  Mother: No PMH, No PSH  Father: Unknown (Sperm donor, does not know history)   Siblings: 16 y/o 1/2 maternal brother: No PMH, No PSH  18 y/o 1/2 maternal brother: No PMH, No PSH  25 y/o 1/2 maternal sister: No PMH, No PSH  MGM: No PMH, No PSH  MGF: No PMH, No PSH  PGM: unknown  PGF: unknown  Reports no family history of anesthesia complications or prolonged bleeding. 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  She is s/p bidirectional guy anastomosis performed with creation of pulmonary atresia and a fontan procedure consisting of a 16 mm extracardiac, non fenestrating conduit. Most recently she is s/p cardiac catheterization to assess her fontan function, pressures, physiology as well as to assess to veno-venous collaterals in 2021. 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 Denies complications with anesthesia or prolonged bleeding. Presents to PST today for optimization prior to surgery.

## 2025-07-16 NOTE — H&P PST PEDIATRIC - ASSESSMENT
20 year old female presents to PST today for evaluation prior to cardiac catheterization at [facility] [place] on 7/24/2025 with Dr. Power.   No acute signs or symptoms of illness appreciated during this visit.   Patient aware to notify MD if any signs or symptoms of illness develop prior to surgery.   CBC, CMP, and T&S done; pending results.   CHG wipes and instructions given to patient and reviewed with patient by RN, all questions answered.   UCG given to patient for morning of surgery.  20 year old female presents to PST today for evaluation prior to cardiac catheterization at [facility] [place] on 7/24/2025 with Dr. Power.   No acute signs or symptoms of illness appreciated during this visit.   Patient aware to notify MD if any signs or symptoms of illness develop prior to surgery.   CBC, CMP, and T&S done; pending results.   CHG wipes and instructions given to patient and reviewed with patient by RN, all questions answered.   UCG given to patient for morning of surgery.   **Discussed case with Dr. Crowder, recommended patient to be seen by Dr. Power prior to cardiac catheterization. Dr. Power to call patient prior to procedure to review and assess if the patient needs to be seen prior. Recommended stopping aspiring 5 days prior to surgery.  20 year old female presents to PST today for evaluation prior to cardiac catheterization at [facility] on 7/24/2025 with Dr. Power.   No acute signs or symptoms of illness appreciated during this visit.   Patient aware to notify MD if any signs or symptoms of illness develop prior to surgery.   CBC, CMP, and T&S done; pending results.   CHG wipes and instructions given to patient and reviewed with patient by RN, all questions answered.   UCG given to patient for morning of surgery.   **Dr. Power recommended stopping aspirin on 7/17.  20 year old female presents to PST today for evaluation prior to cardiac catheterization at [facility] on 7/24/2025 with Dr. Power.   No acute signs or symptoms of illness appreciated during this visit.   Patient aware to notify MD if any signs or symptoms of illness develop prior to surgery.   CBC, CMP, and T&S done; pending results.   CHG wipes and instructions given to patient and reviewed with patient by RN, all questions answered.   UCG given to patient for morning of surgery.   **Discussed with Dr. Crowder and Dr. Power, patient does not need to be seen by Dr. Power prior to the cardiac catheterization. As per Dr. Power, patient will stop aspirin on 7/17.

## 2025-07-16 NOTE — H&P PST PEDIATRIC - PROBLEM SELECTOR PLAN 1
Scheduled for cardiac catheterization at [facility] [place] on 7/24/2025 with Dr. Power.

## 2025-07-23 ENCOUNTER — NON-APPOINTMENT (OUTPATIENT)
Age: 20
End: 2025-07-23

## 2025-07-24 ENCOUNTER — INPATIENT (INPATIENT)
Age: 20
LOS: 0 days | Discharge: HOME | End: 2025-07-25
Attending: STUDENT IN AN ORGANIZED HEALTH CARE EDUCATION/TRAINING PROGRAM | Admitting: STUDENT IN AN ORGANIZED HEALTH CARE EDUCATION/TRAINING PROGRAM
Payer: MEDICAID

## 2025-07-24 ENCOUNTER — TRANSCRIPTION ENCOUNTER (OUTPATIENT)
Age: 20
End: 2025-07-24

## 2025-07-24 VITALS
DIASTOLIC BLOOD PRESSURE: 77 MMHG | HEIGHT: 62.05 IN | HEART RATE: 65 BPM | WEIGHT: 190.92 LBS | SYSTOLIC BLOOD PRESSURE: 113 MMHG | RESPIRATION RATE: 18 BRPM | OXYGEN SATURATION: 96 % | TEMPERATURE: 97 F

## 2025-07-24 DIAGNOSIS — Z98.890 OTHER SPECIFIED POSTPROCEDURAL STATES: Chronic | ICD-10-CM

## 2025-07-24 DIAGNOSIS — Z98.890 OTHER SPECIFIED POSTPROCEDURAL STATES: ICD-10-CM

## 2025-07-24 DIAGNOSIS — Q20.1 DOUBLE OUTLET RIGHT VENTRICLE: ICD-10-CM

## 2025-07-24 PROCEDURE — 37238 OPEN/PERQ PLACE STENT SAME: CPT | Mod: RT,59

## 2025-07-24 PROCEDURE — 75605 CONTRAST EXAM THORACIC AORTA: CPT | Mod: 26

## 2025-07-24 PROCEDURE — 71045 X-RAY EXAM CHEST 1 VIEW: CPT | Mod: 26

## 2025-07-24 PROCEDURE — 99291 CRITICAL CARE FIRST HOUR: CPT

## 2025-07-24 PROCEDURE — 76937 US GUIDE VASCULAR ACCESS: CPT | Mod: 26

## 2025-07-24 PROCEDURE — 37241 VASC EMBOLIZE/OCCLUDE VENOUS: CPT

## 2025-07-24 PROCEDURE — 75825 VEIN X-RAY TRUNK: CPT | Mod: 26

## 2025-07-24 PROCEDURE — 93597 R&L HRT CATH CHD ABNL NT CNJ: CPT | Mod: 26

## 2025-07-24 PROCEDURE — 93463 DRUG ADMIN & HEMODYNMIC MEAS: CPT

## 2025-07-24 PROCEDURE — 93010 ELECTROCARDIOGRAM REPORT: CPT

## 2025-07-24 PROCEDURE — 75827 VEIN X-RAY CHEST: CPT | Mod: 26

## 2025-07-24 PROCEDURE — 93587 VNGRPH CHD VNVN CLTRL AT/ABV: CPT

## 2025-07-24 PROCEDURE — 93567 NJX CAR CTH SPRVLV AORTGRPHY: CPT

## 2025-07-24 RX ORDER — ASPIRIN 325 MG
162 TABLET ORAL DAILY
Refills: 0 | Status: DISCONTINUED | OUTPATIENT
Start: 2025-07-25 | End: 2025-07-25

## 2025-07-24 RX ORDER — KETOROLAC TROMETHAMINE 30 MG/ML
30 INJECTION, SOLUTION INTRAMUSCULAR; INTRAVENOUS ONCE
Refills: 0 | Status: DISCONTINUED | OUTPATIENT
Start: 2025-07-24 | End: 2025-07-24

## 2025-07-24 RX ORDER — ONDANSETRON HCL/PF 4 MG/2 ML
4 VIAL (ML) INJECTION ONCE
Refills: 0 | Status: DISCONTINUED | OUTPATIENT
Start: 2025-07-24 | End: 2025-07-24

## 2025-07-24 RX ORDER — ONDANSETRON HCL/PF 4 MG/2 ML
4 VIAL (ML) INJECTION ONCE
Refills: 0 | Status: COMPLETED | OUTPATIENT
Start: 2025-07-24 | End: 2025-07-24

## 2025-07-24 RX ORDER — CEFAZOLIN SODIUM IN 0.9 % NACL 3 G/100 ML
2000 INTRAVENOUS SOLUTION, PIGGYBACK (ML) INTRAVENOUS EVERY 8 HOURS
Refills: 0 | Status: COMPLETED | OUTPATIENT
Start: 2025-07-24 | End: 2025-07-25

## 2025-07-24 RX ORDER — ACETAMINOPHEN 500 MG/5ML
650 LIQUID (ML) ORAL EVERY 6 HOURS
Refills: 0 | Status: DISCONTINUED | OUTPATIENT
Start: 2025-07-24 | End: 2025-07-25

## 2025-07-24 RX ADMIN — KETOROLAC TROMETHAMINE 30 MILLIGRAM(S): 30 INJECTION, SOLUTION INTRAMUSCULAR; INTRAVENOUS at 17:14

## 2025-07-24 RX ADMIN — Medication 4 MILLIGRAM(S): at 17:37

## 2025-07-24 RX ADMIN — Medication 650 MILLIGRAM(S): at 20:59

## 2025-07-24 RX ADMIN — Medication 200 MILLIGRAM(S): at 22:32

## 2025-07-24 RX ADMIN — Medication 650 MILLIGRAM(S): at 21:30

## 2025-07-24 RX ADMIN — KETOROLAC TROMETHAMINE 30 MILLIGRAM(S): 30 INJECTION, SOLUTION INTRAMUSCULAR; INTRAVENOUS at 18:15

## 2025-07-25 ENCOUNTER — RESULT REVIEW (OUTPATIENT)
Age: 20
End: 2025-07-25

## 2025-07-25 ENCOUNTER — TRANSCRIPTION ENCOUNTER (OUTPATIENT)
Age: 20
End: 2025-07-25

## 2025-07-25 VITALS
OXYGEN SATURATION: 97 % | RESPIRATION RATE: 18 BRPM | TEMPERATURE: 98 F | HEART RATE: 73 BPM | SYSTOLIC BLOOD PRESSURE: 104 MMHG | DIASTOLIC BLOOD PRESSURE: 69 MMHG

## 2025-07-25 PROCEDURE — 93320 DOPPLER ECHO COMPLETE: CPT | Mod: 26

## 2025-07-25 PROCEDURE — 99232 SBSQ HOSP IP/OBS MODERATE 35: CPT

## 2025-07-25 PROCEDURE — 93325 DOPPLER ECHO COLOR FLOW MAPG: CPT | Mod: 26

## 2025-07-25 PROCEDURE — 93303 ECHO TRANSTHORACIC: CPT | Mod: 26

## 2025-07-25 RX ADMIN — Medication 162 MILLIGRAM(S): at 10:26

## 2025-07-25 RX ADMIN — Medication 200 MILLIGRAM(S): at 06:14

## 2025-08-27 ENCOUNTER — NON-APPOINTMENT (OUTPATIENT)
Age: 20
End: 2025-08-27

## 2025-08-27 ENCOUNTER — APPOINTMENT (OUTPATIENT)
Dept: PEDIATRIC CARDIOLOGY | Facility: CLINIC | Age: 20
End: 2025-08-27

## 2025-08-27 ENCOUNTER — APPOINTMENT (OUTPATIENT)
Dept: PEDIATRIC CARDIOLOGY | Facility: CLINIC | Age: 20
End: 2025-08-27
Payer: MEDICAID

## 2025-08-27 ENCOUNTER — RESULT CHARGE (OUTPATIENT)
Age: 20
End: 2025-08-27

## 2025-08-27 VITALS
DIASTOLIC BLOOD PRESSURE: 82 MMHG | HEIGHT: 62.01 IN | SYSTOLIC BLOOD PRESSURE: 124 MMHG | WEIGHT: 192.02 LBS | HEART RATE: 87 BPM | BODY MASS INDEX: 34.89 KG/M2 | OXYGEN SATURATION: 96 %

## 2025-08-27 DIAGNOSIS — K76.9 OTHER POSTPROCEDURAL COMPLICATIONS AND DISORDERS OF DIGESTIVE SYSTEM: ICD-10-CM

## 2025-08-27 DIAGNOSIS — E66.9 OBESITY, UNSPECIFIED: ICD-10-CM

## 2025-08-27 DIAGNOSIS — Z87.74 PERSONAL HISTORY OF (CORRECTED) CONGENITAL MALFORMATIONS OF HEART AND CIRCULATORY SYSTEM: ICD-10-CM

## 2025-08-27 DIAGNOSIS — Q24.9 CONGENITAL MALFORMATION OF HEART, UNSPECIFIED: ICD-10-CM

## 2025-08-27 DIAGNOSIS — K91.89 OTHER POSTPROCEDURAL COMPLICATIONS AND DISORDERS OF DIGESTIVE SYSTEM: ICD-10-CM

## 2025-08-27 DIAGNOSIS — Z87.74 OTHER POSTPROCEDURAL COMPLICATIONS AND DISORDERS OF DIGESTIVE SYSTEM: ICD-10-CM

## 2025-08-27 DIAGNOSIS — Z98.890 OTHER SPECIFIED POSTPROCEDURAL STATES: ICD-10-CM

## 2025-08-27 DIAGNOSIS — Q20.4 DOUBLE INLET VENTRICLE: ICD-10-CM

## 2025-08-27 DIAGNOSIS — Q20.1 DOUBLE OUTLET RIGHT VENTRICLE: ICD-10-CM

## 2025-08-27 DIAGNOSIS — I27.20 PULMONARY HYPERTENSION, UNSPECIFIED: ICD-10-CM

## 2025-08-27 DIAGNOSIS — R09.02 HYPOXEMIA: ICD-10-CM

## 2025-08-27 PROCEDURE — 93320 DOPPLER ECHO COMPLETE: CPT

## 2025-08-27 PROCEDURE — 93303 ECHO TRANSTHORACIC: CPT

## 2025-08-27 PROCEDURE — 93000 ELECTROCARDIOGRAM COMPLETE: CPT | Mod: 59

## 2025-08-27 PROCEDURE — 94010 BREATHING CAPACITY TEST: CPT

## 2025-08-27 PROCEDURE — 93325 DOPPLER ECHO COLOR FLOW MAPG: CPT

## 2025-08-27 PROCEDURE — 99214 OFFICE O/P EST MOD 30 MIN: CPT | Mod: 25

## 2025-08-27 PROCEDURE — 93015 CV STRESS TEST SUPVJ I&R: CPT

## 2025-08-27 PROCEDURE — 94681 O2 UPTK CO2 OUTP % O2 XTRC: CPT

## 2025-08-27 PROCEDURE — 99214 OFFICE O/P EST MOD 30 MIN: CPT

## 2025-08-28 PROBLEM — I27.20 PULMONARY HYPERTENSION: Status: ACTIVE | Noted: 2025-08-27

## 2025-08-28 RX ORDER — TADALAFIL 20 MG/1
20 TABLET ORAL DAILY
Qty: 180 | Refills: 0 | Status: ACTIVE | COMMUNITY
Start: 2025-08-27 | End: 1900-01-01

## 2025-09-15 ENCOUNTER — APPOINTMENT (OUTPATIENT)
Dept: HEPATOLOGY | Facility: CLINIC | Age: 20
End: 2025-09-15